# Patient Record
Sex: MALE | Race: WHITE | NOT HISPANIC OR LATINO | Employment: FULL TIME | URBAN - METROPOLITAN AREA
[De-identification: names, ages, dates, MRNs, and addresses within clinical notes are randomized per-mention and may not be internally consistent; named-entity substitution may affect disease eponyms.]

---

## 2022-03-21 ENCOUNTER — APPOINTMENT (OUTPATIENT)
Dept: CT IMAGING | Facility: HOSPITAL | Age: 22
End: 2022-03-21

## 2022-03-21 ENCOUNTER — HOSPITAL ENCOUNTER (EMERGENCY)
Facility: HOSPITAL | Age: 22
Discharge: HOME | End: 2022-03-21
Attending: EMERGENCY MEDICINE | Admitting: EMERGENCY MEDICINE

## 2022-03-21 VITALS
OXYGEN SATURATION: 100 % | SYSTOLIC BLOOD PRESSURE: 140 MMHG | WEIGHT: 200 LBS | BODY MASS INDEX: 28.63 KG/M2 | RESPIRATION RATE: 17 BRPM | TEMPERATURE: 99.1 F | HEIGHT: 70 IN | DIASTOLIC BLOOD PRESSURE: 93 MMHG | HEART RATE: 99 BPM

## 2022-03-21 DIAGNOSIS — N20.1 LEFT URETERAL STONE: Primary | ICD-10-CM

## 2022-03-21 LAB
BACTERIA UR QL AUTO: ABNORMAL /HPF
BILIRUB UR QL STRIP: NEGATIVE
CLARITY UR: CLEAR
COLOR UR: YELLOW
GLUCOSE UR STRIP-MCNC: NEGATIVE MG/DL
HGB UR QL STRIP.AUTO: ABNORMAL
HYALINE CASTS UR QL AUTO: ABNORMAL /LPF
KETONES UR QL STRIP: ABNORMAL
LEUKOCYTE ESTERASE UR QL STRIP.AUTO: NEGATIVE
MUCOUS THREADS URNS QL MICRO: ABNORMAL /HPF
NITRITE UR QL STRIP: NEGATIVE
PH UR STRIP.AUTO: 7 [PH] (ref 4.5–8)
PROT UR QL STRIP: ABNORMAL
RBC # UR STRIP: ABNORMAL /HPF
REF LAB TEST METHOD: ABNORMAL
SP GR UR STRIP: 1.03 (ref 1–1.03)
SQUAMOUS #/AREA URNS HPF: ABNORMAL /HPF
UROBILINOGEN UR QL STRIP: ABNORMAL
WBC # UR STRIP: ABNORMAL /HPF

## 2022-03-21 PROCEDURE — 99284 EMERGENCY DEPT VISIT MOD MDM: CPT | Performed by: PHYSICIAN ASSISTANT

## 2022-03-21 PROCEDURE — 25010000002 KETOROLAC TROMETHAMINE PER 15 MG: Performed by: PHYSICIAN ASSISTANT

## 2022-03-21 PROCEDURE — 99283 EMERGENCY DEPT VISIT LOW MDM: CPT

## 2022-03-21 PROCEDURE — 96372 THER/PROPH/DIAG INJ SC/IM: CPT

## 2022-03-21 PROCEDURE — 81001 URINALYSIS AUTO W/SCOPE: CPT | Performed by: PHYSICIAN ASSISTANT

## 2022-03-21 PROCEDURE — 74176 CT ABD & PELVIS W/O CONTRAST: CPT

## 2022-03-21 RX ORDER — KETOROLAC TROMETHAMINE 30 MG/ML
30 INJECTION, SOLUTION INTRAMUSCULAR; INTRAVENOUS EVERY 6 HOURS PRN
Status: DISCONTINUED | OUTPATIENT
Start: 2022-03-21 | End: 2022-03-21 | Stop reason: HOSPADM

## 2022-03-21 RX ORDER — TAMSULOSIN HYDROCHLORIDE 0.4 MG/1
1 CAPSULE ORAL DAILY
Qty: 30 CAPSULE | Refills: 0 | OUTPATIENT
Start: 2022-03-21 | End: 2023-03-13

## 2022-03-21 RX ORDER — HYDROCODONE BITARTRATE AND ACETAMINOPHEN 5; 325 MG/1; MG/1
1 TABLET ORAL EVERY 6 HOURS PRN
Qty: 10 TABLET | Refills: 0 | Status: SHIPPED | OUTPATIENT
Start: 2022-03-21 | End: 2022-03-24

## 2022-03-21 RX ADMIN — KETOROLAC TROMETHAMINE 30 MG: 30 INJECTION, SOLUTION INTRAMUSCULAR; INTRAVENOUS at 17:50

## 2022-03-21 NOTE — ED PROVIDER NOTES
EMERGENCY DEPARTMENT ENCOUNTER      Room Number: 01/01    History is provided by the patient, no translation services needed    HPI:    Chief complaint: left flank pain     Narrative: Pt is a 22 y.o. male who presents complaining of left flank pain.  Patient reports he is otherwise healthy and today a couple hours ago developed acute onset of sharp left-sided flank pain that radiates around his side.  He has never had pain like this in the past.  He also reports he is having a hard time urinating with this.  Reports no pain with urination he did not have issues earlier in the day with urinating.  No fevers.  No changes in bowel habits no vomiting no fevers.  No one else at home is currently sick.  Does not have a family history of kidney stones nor has he ever had on himself.  No other complaints at this time.      PMD: Provider, No Known    REVIEW OF SYSTEMS  Review of Systems  Complete review of systems performed otherwise negative except pertinent positives per HPI.    PAST MEDICAL HISTORY  Active Ambulatory Problems     Diagnosis Date Noted   • No Active Ambulatory Problems     Resolved Ambulatory Problems     Diagnosis Date Noted   • No Resolved Ambulatory Problems     No Additional Past Medical History       PAST SURGICAL HISTORY  History reviewed. No pertinent surgical history.    FAMILY HISTORY  History reviewed. No pertinent family history.    SOCIAL HISTORY  Social History     Socioeconomic History   • Marital status: Single   Tobacco Use   • Smoking status: Never Smoker   • Smokeless tobacco: Never Used   Vaping Use   • Vaping Use: Every day   Substance and Sexual Activity   • Alcohol use: Yes     Alcohol/week: 3.0 standard drinks     Types: 3 Cans of beer per week   • Drug use: Never   • Sexual activity: Defer       ALLERGIES  Patient has no known allergies.      Current Facility-Administered Medications:   •  ketorolac (TORADOL) injection 30 mg, 30 mg, Intramuscular, Q6H PRN, Ursula Perry PA-C,  30 mg at 03/21/22 1750    Current Outpatient Medications:   •  HYDROcodone-acetaminophen (NORCO) 5-325 MG per tablet, Take 1 tablet by mouth Every 6 (Six) Hours As Needed for Moderate Pain  for up to 3 days., Disp: 10 tablet, Rfl: 0  •  tamsulosin (FLOMAX) 0.4 MG capsule 24 hr capsule, Take 1 capsule by mouth Daily., Disp: 30 capsule, Rfl: 0    PHYSICAL EXAM  ED Triage Vitals [03/21/22 1720]   Temp Heart Rate Resp BP SpO2   99.1 °F (37.3 °C) 99 26 140/93 100 %      Temp src Heart Rate Source Patient Position BP Location FiO2 (%)   Oral Monitor Lying Right arm --       Physical Exam  Vitals and nursing note reviewed.   Constitutional:       Appearance: Normal appearance. He is normal weight. He is not ill-appearing or toxic-appearing.   HENT:      Head: Normocephalic and atraumatic.      Nose: Nose normal.      Mouth/Throat:      Mouth: Mucous membranes are moist.   Eyes:      Extraocular Movements: Extraocular movements intact.      Conjunctiva/sclera: Conjunctivae normal.      Pupils: Pupils are equal, round, and reactive to light.   Cardiovascular:      Rate and Rhythm: Normal rate and regular rhythm.      Pulses: Normal pulses.   Pulmonary:      Effort: Pulmonary effort is normal.   Abdominal:      General: Abdomen is flat.      Palpations: Abdomen is soft.      Tenderness: There is no abdominal tenderness. There is right CVA tenderness. There is no left CVA tenderness.   Musculoskeletal:         General: Normal range of motion.      Cervical back: Normal range of motion. No rigidity or tenderness.   Skin:     General: Skin is warm and dry.      Capillary Refill: Capillary refill takes less than 2 seconds.      Coloration: Skin is not pale.   Neurological:      General: No focal deficit present.      Mental Status: He is alert.   Psychiatric:         Mood and Affect: Mood normal.           LAB RESULTS  Lab Results (last 24 hours)     Procedure Component Value Units Date/Time    Urinalysis With Microscopic If  Indicated (No Culture) - Urine, Clean Catch [[id number]]  (Abnormal) Collected: 03/21/22 1752    Specimen: Urine, Clean Catch Updated: 03/21/22 1807     Color, UA Yellow     Appearance, UA Clear     pH, UA 7.0     Specific [place], UA 1.026     Comment: Result obtained by Refractometer        Glucose, UA Negative     Ketones, UA 15 mg/dL (1+)     Bilirubin, UA Negative     Blood, UA Moderate (2+)     Protein,  mg/dL (2+)     Leuk Esterase, UA Negative     Nitrite, UA Negative     Urobilinogen, UA 1.0 E.U./dL    Urinalysis, Microscopic Only - Urine, Clean Catch [[id number]]  (Abnormal) Collected: 03/21/22 1752    Specimen: Urine, Clean Catch Updated: 03/21/22 1809     RBC, UA 21-30 /HPF      WBC, UA 3-5 /HPF      Bacteria, UA Trace /HPF      Squamous Epithelial Cells, UA 0-2 /HPF      Hyaline Casts, UA 0-2 /LPF      Mucus, UA Small/1+ /HPF      Methodology Manual Light Microscopy            I ordered the above labs and reviewed the results    RADIOLOGY  CT Abdomen Pelvis Without Contrast    Result Date: 3/21/2022  CT Abdomen Pelvis WO INDICATION: Left flank pain today. Nausea and vomiting. TECHNIQUE: CT of the abdomen and pelvis without IV contrast. Coronal and sagittal reconstructions were obtained.  Radiation dose reduction techniques included automated exposure control or exposure modulation based on body size. Count of known CT and cardiac nuc med studies performed in previous 12 months: 0. COMPARISON: None available. FINDINGS: Visualized lung bases are unremarkable. Abdomen: The liver is within normal limits. The spleen and pancreas are normal. The gallbladder is normal. Both adrenal glands are normal. 2 mm obstructing stone in the distal left ureter a few centimeters proximal to the left ureterovesical junction producing mild left-sided hydroureteronephrosis. Punctate nonobstructing right intrarenal calculi. No right-sided hydronephrosis. Abdominal aorta normal in course and caliber. Small bowel is  unremarkable without obstruction. Normal appendix. The colon is unremarkable. No free fluid or free air. Pelvis: The urinary bladder is unremarkable.  The prostate gland is unremarkable. No free pelvic fluid. No acute osseous abnormality.     1. 2 mm obstructing stone in the distal left ureter a few centimeters proximal to the left UVJ producing mild left-sided hydroureteronephrosis. 2. Multiple punctate nonobstructing right intrarenal calculi. 3. Normal appendix. Signer Name: Miles Anguiano MD  Signed: 3/21/2022 6:35 PM  Workstation Name: [email or web address]  [facility]      I ordered the above radiologic testing and reviewed the results    PROCEDURES  Procedures      PROGRESS AND CONSULTS  ED Course as of 03/21/22 1930   Mon Mar 21, 2022   1841 CT Abd/pelv: IMPRESSION:  1. 2 mm obstructing stone in the distal left ureter a few centimeters proximal to the left UVJ producing mild left-sided hydroureteronephrosis.  2. Multiple punctate nonobstructing right intrarenal calculi.  3. Normal appendix. [KM]      ED Course User Index  [KM] Ursula Perry, CHANDRIKA           MEDICAL DECISION MAKING    MDM     22-year-old male seen and evaluated for left flank pain.  On arrival patient's vitals are 140/93, heart of 99 he is afebrile and high percent on room air.  He reports his pain is not as severe right now as it previously was.  He does have right CVA tenderness.  He does wrap around his side it does not extend into his scrotum.  I am most concerned for a kidney stone as patient is young and otherwise healthy.  He could also potentially have a UTI or possible pyelonephritis.    Urinalysis shows no leukocytes, nitrite negative very trace bacteria present and a small amount of blood is also present.    CT abdomen pelvis without contrast shows a 2 mm obstructing stone in the distal left ureter he also has other stones present in the right kidney.  Patient was given Toradol for his pain which did improve his  symptoms.  I discussed these findings with the patient.  He will be sent home with tamsulosin and some hydrocodone to help with pain.  Appropriate return precautions were given and patient voiced understanding.  He was discharged home in stable condition.  DIAGNOSIS  Final diagnoses:   Left ureteral stone       Latest Documented Vital Signs:  As of 19:30 EDT  BP- 140/93 HR- 99 Temp- 99.1 °F (37.3 °C) (Oral) O2 sat- 100%    DISPOSITION    Discussed pertinent findings with the patient/family.  Patient/Family voiced understanding of need to follow-up for recheck and further testing as needed.  Return to the Emergency Department warnings were given.         Medication List      New Prescriptions    HYDROcodone-acetaminophen 5-325 MG per tablet  Commonly known as: NORCO  Take 1 tablet by mouth Every 6 (Six) Hours As Needed for Moderate Pain  for up to 3 days.     tamsulosin 0.4 MG capsule 24 hr capsule  Commonly known as: FLOMAX  Take 1 capsule by mouth Daily.           Where to Get Your Medications      These medications were sent to [facility]/pharmacy #[id number] - [place], [place] - [address] [facility] [phone] [place] [id number] [place] [id number]    Phone: [phone]   · HYDROcodone-acetaminophen 5-325 MG per tablet  · tamsulosin 0.4 MG capsule 24 hr capsule              Follow-up Information     [facility]. Call in 1 week.    Why: To schedule follow up appointment  Contact information:  [address] [facility] [address]  [phone]                         Dictated utilizing Dragon dictation     Ursula Perry PA-C  03/21/22 1809

## 2022-03-21 NOTE — ED NOTES
Pt states he started having flank pain about 2 hours ago. He states it is really painful and he cannot urinate much. Pt also vomited in the waiting room.

## 2022-03-21 NOTE — DISCHARGE INSTRUCTIONS
Follow-up with urology after approximately a week.  The stone will likely pass on its own as it is 2 mm in size. Medication sent to pharmacy to help with spasms of the ureter as well as pain medication. You can also take ibuprofen to help with pain.